# Patient Record
(demographics unavailable — no encounter records)

---

## 2025-07-09 NOTE — PHYSICAL EXAM
[TextEntry] : General: Awake, alert, NAD. Appears stated age and is a good historian. Head: Temporal hairline with evidence of recession, central hairline also moderately receded. [place] 2  Brow/orbit: position at or below orbital rim with moderate bone and soft tissue hooding of the orbit. Mild frontal bossing with deepend nasion Midface: Positive vector. Nose: Widened nasal dorsum with prominent bony-cartilagenous hump. Widened nasal tip. Moderate underrotation and overprojection of entire dorsum.  Mouth: Flat upper lip with 1:1 upper:lower vermilion show, elongated cutaneous upper lip with limited gingival show  Jaw: Mandibular angle is obscured with moderate bilateral adiposity, chin is elongated and widened approximating interpupillary width  Neck: moderate submental fat  Voice: reads feminine.

## 2025-07-09 NOTE — HISTORY OF PRESENT ILLNESS
[de-identified] :  Visit today was conducted over two-way audiovisual technology. Verbal consent for telehealth visit was given on 07/09/2025 at 11:14 by AELXA PHOENIX. Limited examination was performed due to telehealth nature of this visit.  Ms. ALEXA PHOENIX is a pleasant 31 year transgender male/female presenting today as self-referral for gender dysphoria.   She was assigned male at birth, was gender nonconforming throughout childhood, and has been consciously identifying as a woman since 2021 and was fully socially transitioned since then. She began HRT in 2021. She has noticed new hairline growth with HRT and feels her face is more round. She did also undergo hair transplantation to the temporal recesses in [place] in 2021 with good take. She did some laser facial hair removal and and electrolysis but has since stopped. She participated in voice therapy for 4 years and is happy with her voice. She has not undergone any other gender affirmation surgery.  She continues to experience significant misgendering and continues to feel unsafe in public as a result. Her greatest dysphoria is related to her nose as well as her chin. She also feels her hairline is still square and her brows are low. Her goal is to appear as feminine as possible.    She denies h/o allergic rhinitis or functional concerns regarding her nose. She has undergone septoplasty in 2008 with improvement in nasal obstruction symptoms. Also undergone tonsillectomy and wisdom teeth removal. Past medical/surgical history includes ILYA. Nonsmoker. Not on anticoagulation. Support system consists of immediate family with whom she lives in [place].

## 2025-07-09 NOTE — ASSESSMENT
[FreeTextEntry1] : . Plan: We had a lengthy discussion with the patient regarding her symptoms, exam findings, and numerous options for gender-affirming facial surgery. The majority of her dysphoria is related to her hairline, forehead/brows/orbits, nose, midface, chin/jawline, and laryngeal/thyroid cartilage, all of which have a notably masculine appearance due to prior effects of testosterone on the facial and laryngeal structure.   We discussed that surgery to address these issues would involve: -Hairline advancement to reduce temporal recession -Forehead reduction superolaterally and in the region of the brow/orbits and nasion transition (may require frontal sinus setback) -Pretrichial brow lift -Feminizing rhinoplasty with dorsal hump reduction, narrowing, tip refinement and rotation  -Autologous fat grafting to lip with lip lift -Chin reduction and mandibular osteotomy/osteoplasty with contouring -Submental liposuction to aid with cervical skin redraping after mandible reduction   These procedures represent moderate-to-high surgical complexity with moderate risks, including but not limited to infection, bleeding, scarring, nerve damage, undesired change in appearance, asymmetry, overcorrection, undercorrection, postoperative hair loss, scalp numbness, sinus drainage abnormalities and future mucocele risk if the frontal sinus is entered, requirement for hardware and possibly hardware infection/hardware removal if the frontal sinus must be addressed, very rare but possible risk of permanent voice change or temporary airway obstruction, and approximately 10% risk of needing revision surgery.   We reviewed risks, benefits, and alternatives for each procedure, and discussed the anticipated postoperative course along with recommendations for reduced activity for 1-2 weeks, liquid diet for 1 week after chin/jaw reduction, no glasses use for 1 month after rhinoplasty, nasal precautions after rhinoplasty and/or frontal sinus setback, and anticipation of swelling that is anticipated to continue for over a month and may not completely resolve for 1 year after surgery.   These combined procedures would have the anticipated effective improvement in quality of life and reduction of suicidal ideation or participation in activities of self harm. Tru et al 2010 reported on the improvement in quality of life with gender-affirming facial surgery and showed a baseline decreased quality of life for transgender women that did not undergo facial feminization compared to the general population. The 2015 transgender survey also demonstrated significant increase in suicidal ideation and attempted suicide with increase in traumatic experiences related to gender dysphoria. This patient is at high risk given her previous experiences and gender-affirming facial surgery is strongly recommended.   We will obtain a CT face/sinus for further evaluation of facial asymmetry before surgery, and consider virtual surgical planning. Pt will meed with Dr Greco for telehealth consultation.    The patient reports understanding of this discussion, has realistic expectations, and would like to move forward with surgery. We will work towards insurance authorization and booking accordingly. Will see again closer to scheduled surgery time with preoperative review.   -- Larisa Allison MD Facial Plastic & Reconstructive Surgery